# Patient Record
Sex: FEMALE | Race: BLACK OR AFRICAN AMERICAN | NOT HISPANIC OR LATINO | Employment: FULL TIME | URBAN - METROPOLITAN AREA
[De-identification: names, ages, dates, MRNs, and addresses within clinical notes are randomized per-mention and may not be internally consistent; named-entity substitution may affect disease eponyms.]

---

## 2019-01-17 ENCOUNTER — HOSPITAL ENCOUNTER (EMERGENCY)
Facility: MEDICAL CENTER | Age: 19
End: 2019-01-17
Attending: EMERGENCY MEDICINE
Payer: COMMERCIAL

## 2019-01-17 VITALS
BODY MASS INDEX: 26.04 KG/M2 | HEART RATE: 85 BPM | OXYGEN SATURATION: 96 % | SYSTOLIC BLOOD PRESSURE: 122 MMHG | HEIGHT: 70 IN | DIASTOLIC BLOOD PRESSURE: 90 MMHG | TEMPERATURE: 97.5 F | RESPIRATION RATE: 16 BRPM | WEIGHT: 181.88 LBS

## 2019-01-17 DIAGNOSIS — L30.9 ECZEMA, UNSPECIFIED TYPE: ICD-10-CM

## 2019-01-17 DIAGNOSIS — L29.9 ITCHING: ICD-10-CM

## 2019-01-17 DIAGNOSIS — L81.9 HYPERPIGMENTATION: ICD-10-CM

## 2019-01-17 LAB
ALBUMIN SERPL BCP-MCNC: 4.9 G/DL (ref 3.2–4.9)
ALBUMIN/GLOB SERPL: 1.4 G/DL
ALP SERPL-CCNC: 71 U/L (ref 30–99)
ALT SERPL-CCNC: 29 U/L (ref 2–50)
ANION GAP SERPL CALC-SCNC: 10 MMOL/L (ref 0–11.9)
ANISOCYTOSIS BLD QL SMEAR: ABNORMAL
AST SERPL-CCNC: 30 U/L (ref 12–45)
BASOPHILS # BLD AUTO: 0.8 % (ref 0–1.8)
BASOPHILS # BLD: 0.04 K/UL (ref 0–0.12)
BILIRUB SERPL-MCNC: 0.4 MG/DL (ref 0.1–1.5)
BUN SERPL-MCNC: 10 MG/DL (ref 8–22)
CALCIUM SERPL-MCNC: 10.1 MG/DL (ref 8.5–10.5)
CHLORIDE SERPL-SCNC: 104 MMOL/L (ref 96–112)
CO2 SERPL-SCNC: 25 MMOL/L (ref 20–33)
COMMENT 1642: NORMAL
CREAT SERPL-MCNC: 0.71 MG/DL (ref 0.5–1.4)
EOSINOPHIL # BLD AUTO: 0.37 K/UL (ref 0–0.51)
EOSINOPHIL NFR BLD: 7 % (ref 0–6.9)
ERYTHROCYTE [DISTWIDTH] IN BLOOD BY AUTOMATED COUNT: 43 FL (ref 35.9–50)
GLOBULIN SER CALC-MCNC: 3.5 G/DL (ref 1.9–3.5)
GLUCOSE SERPL-MCNC: 93 MG/DL (ref 65–99)
HCG SERPL QL: NEGATIVE
HCT VFR BLD AUTO: 37.2 % (ref 37–47)
HGB BLD-MCNC: 10.9 G/DL (ref 12–16)
IMM GRANULOCYTES # BLD AUTO: 0.01 K/UL (ref 0–0.11)
IMM GRANULOCYTES NFR BLD AUTO: 0.2 % (ref 0–0.9)
LG PLATELETS BLD QL SMEAR: NORMAL
LYMPHOCYTES # BLD AUTO: 2.77 K/UL (ref 1–4.8)
LYMPHOCYTES NFR BLD: 52.7 % (ref 22–41)
MCH RBC QN AUTO: 20.6 PG (ref 27–33)
MCHC RBC AUTO-ENTMCNC: 29.3 G/DL (ref 33.6–35)
MCV RBC AUTO: 70.2 FL (ref 81.4–97.8)
MICROCYTES BLD QL SMEAR: ABNORMAL
MONOCYTES # BLD AUTO: 0.62 K/UL (ref 0–0.85)
MONOCYTES NFR BLD AUTO: 11.8 % (ref 0–13.4)
MORPHOLOGY BLD-IMP: NORMAL
NEUTROPHILS # BLD AUTO: 1.45 K/UL (ref 2–7.15)
NEUTROPHILS NFR BLD: 27.5 % (ref 44–72)
NRBC # BLD AUTO: 0 K/UL
NRBC BLD-RTO: 0 /100 WBC
OVALOCYTES BLD QL SMEAR: NORMAL
PLATELET # BLD AUTO: 493 K/UL (ref 164–446)
PLATELET BLD QL SMEAR: NORMAL
PMV BLD AUTO: 8.9 FL (ref 9–12.9)
POIKILOCYTOSIS BLD QL SMEAR: NORMAL
POTASSIUM SERPL-SCNC: 3.8 MMOL/L (ref 3.6–5.5)
PROT SERPL-MCNC: 8.4 G/DL (ref 6–8.2)
RBC # BLD AUTO: 5.3 M/UL (ref 4.2–5.4)
RBC BLD AUTO: PRESENT
SMUDGE CELLS BLD QL SMEAR: NORMAL
SODIUM SERPL-SCNC: 139 MMOL/L (ref 135–145)
WBC # BLD AUTO: 5.3 K/UL (ref 4.8–10.8)

## 2019-01-17 PROCEDURE — 80053 COMPREHEN METABOLIC PANEL: CPT

## 2019-01-17 PROCEDURE — 99284 EMERGENCY DEPT VISIT MOD MDM: CPT

## 2019-01-17 PROCEDURE — 36415 COLL VENOUS BLD VENIPUNCTURE: CPT

## 2019-01-17 PROCEDURE — 84703 CHORIONIC GONADOTROPIN ASSAY: CPT

## 2019-01-17 PROCEDURE — 85025 COMPLETE CBC W/AUTO DIFF WBC: CPT

## 2019-01-17 RX ORDER — TRIAMCINOLONE ACETONIDE 0.25 MG/G
1 CREAM TOPICAL 2 TIMES DAILY
Qty: 1 TUBE | Refills: 1 | Status: SHIPPED | OUTPATIENT
Start: 2019-01-17 | End: 2019-02-05

## 2019-01-17 ASSESSMENT — PAIN SCALES - GENERAL: PAINLEVEL_OUTOF10: 0

## 2019-01-17 ASSESSMENT — LIFESTYLE VARIABLES: DO YOU DRINK ALCOHOL: NO

## 2019-01-18 NOTE — DISCHARGE INSTRUCTIONS
Please follow-up with the primary care clinic listed.  Return to the emergency department if you develop any new or worsening symptoms including open sores, fevers, worsening rashes, swelling, or any further concerns.  Please visit the [facility] website to find a dermatologist in the area.  Call the primary care clinic listed above tomorrow to schedule a primary care follow-up appointment.

## 2019-01-18 NOTE — ED TRIAGE NOTES
"Chief Complaint   Patient presents with   • Eczema       Pt ambulatory to triage with above complaint.  Pt states she has hx of eczema and that today it got worse.  Pt has dark spots on arms torso and legs.     Pt returned to lobby, educated on triage process, and to inform staff of any changes or concerns.    Blood pressure 126/80, pulse 84, temperature 36.4 °C (97.5 °F), temperature source Temporal, resp. rate 16, height 1.778 m (5' 10\"), weight 82.5 kg (181 lb 14.1 oz), last menstrual period 01/07/2019, SpO2 98 %.      "

## 2019-01-18 NOTE — ED PROVIDER NOTES
"ED Provider Note    Chief Complaint:   Itching    HPI:  Jo Wild is a 18 y.o. female who presents with chief complaint of skin itching and discoloration.  She has been diagnosed with severe eczema previously, states that her symptoms significantly improved with use of triamcinolone cream or other steroid creams, however they recur as soon as she stops using these medications.  Over the past year, she has developed darkening of the skin.  She is seeing her primary care physician at [facility] multiple times, who states this is chronic and unlikely to change.  Affected areas include the trunk, back, as well as all 4 extremities.  For the past week, she has had an exacerbation of skin irritation and itching.  This is identical to previous recurrences of her eczema.  She has not yet been able to follow-up with the [facility] dermatologist or dermatologist in [place] for further evaluation.    She denies any other symptoms, no abnormal bleeding or bruising, no fevers.  She is not currently taking any medications.    Review of Systems:  See HPI for pertinent positives and negatives. All other systems negative.    Past Medical History:       Social History:  Social History     Social History Main Topics   • Smoking status: Never Smoker   • Smokeless tobacco: Never Used   • Alcohol use No   • Drug use: No   • Sexual activity: Not on file       Surgical History:  patient denies any surgical history    Current Medications:  Home Medications     Reviewed by Luis Myers R.N. (Registered Nurse) on 01/17/19 at 2010  Med List Status: Not Addressed   Medication Last Dose Status        Patient Chris Taking any Medications                       Allergies:  No Known Allergies    Physical Exam:  Vital Signs: /80   Pulse 84   Temp 36.4 °C (97.5 °F) (Temporal)   Resp 16   Ht 1.778 m (5' 10\")   Wt 82.5 kg (181 lb 14.1 oz)   LMP 01/07/2019 (Exact Date)   SpO2 98%   BMI 26.10 kg/m²   Constitutional: Alert, no acute " distress  HENT: Moist mucus membranes  Eyes: Pupils equal and reactive, normal conjunctiva  Neck: Supple  Cardiovascular: Extremities are warm and well perfused  Pulmonary: No respiratory distress, normal work of breathing  Abdomen: Soft, non-distended  Skin: Warm, dry, scaling itching skin over the flexor surfaces, hyperpigmentation, possible purpura over the thighs, calves, and arms, no open sores, no petechiae  Musculoskeletal: Normal range of motion in all extremities  Neurologic: Alert, oriented, normal speech, normal motor function  Psychiatric: Normal and appropriate mood and affect    Medical records reviewed for continuity of care.  No recent visits for similar symptoms per    Labs:  Labs Reviewed   COMP METABOLIC PANEL - Abnormal; Notable for the following:        Result Value    Total Protein 8.4 (*)     All other components within normal limits   CBC WITH DIFFERENTIAL   HCG QUAL SERUM   ESTIMATED GFR       Radiology:    Differential diagnosis:  Purpura, vasculitis, eczema, chronic skin changes    MDM:  History and physical exam as documented above.  Patient presents with intermittent irritation of the skin for the past year.  She is previously been diagnosed with eczema, however since her hyperpigmentation and skin darkening began she has not had any screening lab work.  Abnormal coloration may represent vasculitis or purpura, however this may also be a manifestation of her chronic eczema.    On laboratory evaluation CMP is entirely within normal limits.  Liver enzymes within normal limits.  No electrolyte abnormalities.  HCG is negative ruling out pregnancy and pregnancy related complications. CBC demonstrates normal white blood count.  Hemoglobin is low at 10.9.  Platelet count is above normal, no thrombocytopenia.    Plan at this time is for discharge home.  I will refill her triamcinolone cream.  She is counseled to follow-up with primary care in [place] or at [place].  She is referred to [facility] website for assistance with finding a dermatologist.  Return precautions discussed including worsening pain, redness, itching, swelling, fevers, or any further concerns.    Blood pressure today is greater than 120/80, patient is instructed to follow up with primary care provider for blood pressure recheck.    Disposition:  Discharged home in stable condition    Final Impression:  1. Eczema, unspecified type    2. Hyperpigmentation    3. Itching        Electronically signed by: Nadia Hook, 1/17/2019 10:39 PM

## 2019-01-25 ENCOUNTER — HOSPITAL ENCOUNTER (EMERGENCY)
Facility: MEDICAL CENTER | Age: 19
End: 2019-01-25
Attending: EMERGENCY MEDICINE
Payer: COMMERCIAL

## 2019-01-25 VITALS
HEIGHT: 70 IN | HEART RATE: 84 BPM | RESPIRATION RATE: 16 BRPM | BODY MASS INDEX: 26.67 KG/M2 | DIASTOLIC BLOOD PRESSURE: 73 MMHG | WEIGHT: 186.29 LBS | SYSTOLIC BLOOD PRESSURE: 117 MMHG | TEMPERATURE: 97.4 F | OXYGEN SATURATION: 98 %

## 2019-01-25 DIAGNOSIS — L30.9 ECZEMA, UNSPECIFIED TYPE: ICD-10-CM

## 2019-01-25 PROCEDURE — 99283 EMERGENCY DEPT VISIT LOW MDM: CPT

## 2019-01-25 RX ORDER — PREDNISONE 10 MG/1
TABLET ORAL
Qty: 42 TAB | Refills: 0 | Status: SHIPPED | OUTPATIENT
Start: 2019-01-25 | End: 2019-04-09

## 2019-01-25 NOTE — ED PROVIDER NOTES
"ED Provider Note    CHIEF COMPLAINT   Chief Complaint   Patient presents with   • Rash       HPI   Jo Wild is a 18 y.o. female who presents to the emergency department with chief complaint of eczema.  The patient reports diffuse rash over her extremities and torso.  She is been using triamcinolone ointment with really minimal improvement.  She has not had any fevers.  No pain but she does describe severe itching.    REVIEW OF SYSTEMS   See HPI for further details. All other systems are negative.     PAST MEDICAL HISTORY   History reviewed. No pertinent past medical history.    FAMILY HISTORY  History reviewed. No pertinent family history.    SOCIAL HISTORY  Social History     Social History   • Marital status: Single     Spouse name: N/A   • Number of children: N/A   • Years of education: N/A     Social History Main Topics   • Smoking status: Never Smoker   • Smokeless tobacco: Never Used   • Alcohol use No   • Drug use: No   • Sexual activity: Not on file     Other Topics Concern   • Not on file     Social History Narrative   • No narrative on file        SURGICAL HISTORY  History reviewed. No pertinent surgical history.    CURRENT MEDICATIONS   Home Medications    **Home medications have not yet been reviewed for this encounter**         ALLERGIES   No Known Allergies    PHYSICAL EXAM  VITAL SIGNS: /73   Pulse 84   Temp 36.3 °C (97.4 °F) (Temporal)   Resp 16   Ht 1.778 m (5' 10\")   Wt 84.5 kg (186 lb 4.6 oz)   LMP 01/07/2019 (Exact Date)   SpO2 98%   BMI 26.73 kg/m²   Constitutional: Well developed, Well nourished, No acute distress, Non-toxic appearance.   Cardiovascular: Normal heart rate, Normal rhythm, No murmurs, No rubs, No gallops.   Thorax & Lungs: Normal breath sounds, No respiratory distress, No wheezing, No chest tenderness.   Skin: Eczema noted on all extremities including the extensor surface of bilateral lower extremities and bilateral upper extremities.  Also involving the anterior " abdominal wall.  Scattered excoriations.  No purulent discharge.  No abscess.  No evidence of secondary bacterial infection.  Extremities: Intact distal pulses, No edema, No tenderness, No cyanosis, No clubbing.       COURSE & MEDICAL DECISION MAKING  Pertinent Labs & Imaging studies reviewed. (See chart for details)    Patient presents today with fairly severe eczema.  Conservative measures were discussed.  However given the extent of the involvement of the patient's skin I feel that oral glucocorticoids are indicated.  I will put the patient on a 12-day tapering dose of prednisone.  The patient will continue using triamcinolone for any particularly problematic areas.  The patient sees a physician at [place] in [place].  I will have her follow-up for ongoing care.  Patient is discharged home in stable condition.    The patient will return for new or worsening symptoms and is stable at the time of discharge.    The patient is referred to a primary physician for blood pressure management, diabetic screening, and for all other preventative health concerns.    DISPOSITION:  Patient will be discharged home in stable condition.    FOLLOW UP:  [facility], Emergency Dept  [facility] [facility] [address] [phone]        your doctor at [facility]    Schedule an appointment as soon as possible for a visit         OUTPATIENT MEDICATIONS:  New Prescriptions    No medications on file       FINAL IMPRESSION  1. Eczema, unspecified type                  Electronically signed by: Roland Dalton, 1/25/2019 7:30 AM

## 2019-01-25 NOTE — ED NOTES
Pt reports hx of eczema without relief from previous treatments.  Pt with skin discoloration and itching.

## 2019-01-25 NOTE — ED TRIAGE NOTES
Pt ambulatory to triage reports being diagnosed with eczema on 1-17-19. Pt reports no relief with prescribed meds. VSS.  Educated on triage process, encouraged to inform staff of any changes.

## 2019-01-25 NOTE — ED NOTES
Pt provided with discharge instructions, prescriptions x1, instructions for follow up appointment, s/s of when to seek emergency care.  Pt verbalizes understanding.  Pt discharged in good condition.

## 2019-02-05 ENCOUNTER — HOSPITAL ENCOUNTER (EMERGENCY)
Facility: MEDICAL CENTER | Age: 19
End: 2019-02-05
Attending: EMERGENCY MEDICINE
Payer: COMMERCIAL

## 2019-02-05 VITALS
WEIGHT: 186.29 LBS | SYSTOLIC BLOOD PRESSURE: 118 MMHG | DIASTOLIC BLOOD PRESSURE: 79 MMHG | HEIGHT: 70 IN | RESPIRATION RATE: 16 BRPM | TEMPERATURE: 97.7 F | BODY MASS INDEX: 26.67 KG/M2 | OXYGEN SATURATION: 96 % | HEART RATE: 95 BPM

## 2019-02-05 DIAGNOSIS — L30.9 ECZEMA, UNSPECIFIED TYPE: ICD-10-CM

## 2019-02-05 PROCEDURE — 99283 EMERGENCY DEPT VISIT LOW MDM: CPT

## 2019-02-05 RX ORDER — HYDROXYZINE HYDROCHLORIDE 25 MG/1
25 TABLET, FILM COATED ORAL 3 TIMES DAILY PRN
Qty: 30 TAB | Refills: 0 | Status: SHIPPED | OUTPATIENT
Start: 2019-02-05

## 2019-02-05 RX ORDER — TRIAMCINOLONE ACETONIDE 1 MG/G
1 OINTMENT TOPICAL 2 TIMES DAILY
Qty: 1 TUBE | Refills: 0 | Status: SHIPPED | OUTPATIENT
Start: 2019-02-05

## 2019-02-05 RX ORDER — TRIAMCINOLONE ACETONIDE 0.25 MG/G
1 CREAM TOPICAL 2 TIMES DAILY
Qty: 1 TUBE | Refills: 1 | Status: SHIPPED | OUTPATIENT
Start: 2019-02-05

## 2019-02-06 NOTE — ED NOTES
Pt ambulate to room, agree with triage notes. Pt has purplish discoloration that appears to be all over her body. Pt states has been present x 1 year but worsened since September.

## 2019-02-06 NOTE — ED TRIAGE NOTES
"Chief Complaint   Patient presents with   • Rash     diffuse eczema, seen for same last week, taking prednisone as rx'd.      Pt to triage for above. Reports she was using the cream but it wasn't working well, states, \"it was thinning her skin\". NAD noted, VSS.    Pt returned to [facility]. Educated on triage process and to inform staff of any changes.     /85   Pulse 100   Temp 36.2 °C (97.1 °F) (Temporal)   Resp 18   Ht 1.778 m (5' 10\")   Wt 84.5 kg (186 lb 4.6 oz)   LMP 01/07/2019 (Exact Date)   SpO2 98%   BMI 26.73 kg/m²     "

## 2019-02-06 NOTE — ED NOTES
Discharge instructions given to patient, prescriptions provided, a verbal understanding of all instructions was stated. Pt preferred to walk out accompanied by self VSS,  all belongings accounted for. Pt instructed to follow up with dermatologist.

## 2019-03-02 NOTE — ED PROVIDER NOTES
"ED Provider Note    CHIEF COMPLAINT  Chief Complaint   Patient presents with   • Rash     diffuse eczema, seen for same last week, taking prednisone as rx'd.        HPI  Jo Wild is a 18 y.o. female who presents for evaluation of eczema.  She complains of severe itching.  Patient has a history of eczema dating back years.  She was prescribed prednisone is asking for something to control her itching.  The symptoms have increased in severity over the past year.  She is attempted to see a dermatologist but has been unable to through the [employer] system in [place].  Nothing makes her symptoms worse or better.  She denies any fever chills sore throat chest pain cough.  She has been using triamcinolone ointment.    REVIEW OF SYSTEMS  See HPI for further details.     PAST MEDICAL HISTORY  History reviewed. No pertinent past medical history.    FAMILY HISTORY  No family history on file.    SOCIAL HISTORY  Social History     Social History   • Marital status: Single     Spouse name: N/A   • Number of children: N/A   • Years of education: N/A     Social History Main Topics   • Smoking status: Never Smoker   • Smokeless tobacco: Never Used   • Alcohol use No   • Drug use: No   • Sexual activity: Not on file     Other Topics Concern   • Not on file     Social History Narrative   • No narrative on file       SURGICAL HISTORY  History reviewed. No pertinent surgical history.    CURRENT MEDICATIONS  Home Medications     Reviewed by João Segura R.N. (Registered Nurse) on 02/05/19 at 4538  Med List Status: Partial   Medication Last Dose Status   predniSONE (DELTASONE) 10 MG Tab taking Active   triamcinolone acetonide (KENALOG) 0.025 % Cream  Active                 ALLERGIES  No Known Allergies    PHYSICAL EXAM  VITAL SIGNS: /79   Pulse 95   Temp 36.5 °C (97.7 °F) (Temporal)   Resp 16   Ht 1.778 m (5' 10\")   Wt 84.5 kg (186 lb 4.6 oz)   LMP 01/07/2019 (Exact Date)   SpO2 96%   BMI 26.73 kg/m²   Constitutional :  " Well developed, Well nourished, No acute distress, Non-toxic appearance.   HENT: Head is atraumatic normocephalic moist mucous membranes  Eyes: Normal-appearing nonicteric  Neck: Normal range of motion, No tenderness, Supple, No stridor.   Lymphatic: No cervical adenopathy is noted.   Cardiovascular: Normal heart rate, Normal rhythm, No murmurs, No rubs, No gallops.   Thorax & Lungs: Equal breath sounds bilaterally no rales rhonchi  Skin: Warm, Dry, patient is evidence of what appears to be chronic eczema, on the extensor surface of lower extremities upper extremities anterior wall back she has numbers excoriations no evidence of cellulitis or purulent discharge  Neurologically she is awake she is alert she has no focal neurological finding              COURSE & MEDICAL DECISION MAKING  Pertinent Labs & Imaging studies reviewed. (See chart for details)  The patient is presenting for evaluation of what appears to be eczema of her chronic nature.  The patient really needs to seen by dermatology for management of her chronic condition she has no evidence of secondary infection.  She will be discharged with hydroxyzine for management of itching refill of her Kenalog ointment and referral to the [facility] for management.    FINAL IMPRESSION  1.  Acute exacerbation of chronic eczema  2.   3.      Electronically signed by: Gaston Galindo, 3/1/2019

## 2019-04-05 ENCOUNTER — HOSPITAL ENCOUNTER (EMERGENCY)
Facility: MEDICAL CENTER | Age: 19
End: 2019-04-05
Attending: EMERGENCY MEDICINE
Payer: COMMERCIAL

## 2019-04-05 VITALS
BODY MASS INDEX: 26.07 KG/M2 | HEART RATE: 84 BPM | OXYGEN SATURATION: 100 % | RESPIRATION RATE: 20 BRPM | TEMPERATURE: 98.1 F | DIASTOLIC BLOOD PRESSURE: 78 MMHG | HEIGHT: 70 IN | WEIGHT: 182.1 LBS | SYSTOLIC BLOOD PRESSURE: 125 MMHG

## 2019-04-05 DIAGNOSIS — L30.9 ECZEMA, UNSPECIFIED TYPE: ICD-10-CM

## 2019-04-05 PROCEDURE — 96372 THER/PROPH/DIAG INJ SC/IM: CPT

## 2019-04-05 PROCEDURE — 99284 EMERGENCY DEPT VISIT MOD MDM: CPT

## 2019-04-05 PROCEDURE — 700111 HCHG RX REV CODE 636 W/ 250 OVERRIDE (IP): Performed by: EMERGENCY MEDICINE

## 2019-04-05 RX ORDER — PIMECROLIMUS 10 MG/G
CREAM TOPICAL
Qty: 1 TUBE | Refills: 0 | Status: SHIPPED | OUTPATIENT
Start: 2019-04-05

## 2019-04-05 RX ORDER — TRIAMCINOLONE ACETONIDE 40 MG/ML
60 INJECTION, SUSPENSION INTRA-ARTICULAR; INTRAMUSCULAR ONCE
Status: COMPLETED | OUTPATIENT
Start: 2019-04-05 | End: 2019-04-05

## 2019-04-05 RX ADMIN — TRIAMCINOLONE ACETONIDE 60 MG: 40 INJECTION, SUSPENSION INTRA-ARTICULAR; INTRAMUSCULAR at 09:38

## 2019-04-05 NOTE — ED PROVIDER NOTES
ED Provider Note    Scribed for Roland Dalton M.D. by Ej Coe. 4/5/2019  8:44 AM    Primary care provider: Pcp Pt States None  Means of arrival: walk in  History obtained from: patient  History limited by: none    CHIEF COMPLAINT  Chief Complaint   Patient presents with   • Rash     Pt reports worsening of her eczema x 2 months. Pt reports that it has gotten so she is in pain while sleeping. Pt previously prescribed triamcinolone, but has no had a prescription for appx 2 months.        HPI  Jo Wild is a 18 y.o. female who presents to the Emergency Department complaining of persistent and gradually worsening rash for the last 2 months. She localizes the rash diffusely over her body but greatest in pain on her buttocks. Patient reports associated pain over the rash, itching. She states that her rash has been worsened in for more than 2 months. Patient has been evaluated previously and prescribed triamcinolone which worked well to improve her pain, but she has run of this medication and has had touble obtaining this again secondary to her insurance being from [place]. She states that she has an appointment next Thursday with a provider in [place] to establish care under her insurance. She states that her pain has become severe, causing her to lose sleep, prompting her to return to the ED for reevaluation. She denies shortness of breath.     REVIEW OF SYSTEMS  Pertinent positives include rash, pain, itching.   Pertinent negatives include no shortness of breath.    All other systems reviewed and negative. See HPI for further details.     PAST MEDICAL HISTORY   No history of asthma.     SURGICAL HISTORY  patient denies any surgical history    SOCIAL HISTORY  Social History   Substance Use Topics   • Smoking status: Never Smoker   • Smokeless tobacco: Never Used   • Alcohol use No      History   Drug Use No       FAMILY HISTORY  History reviewed. No pertinent family history.    CURRENT  "MEDICATIONS  Home Medications     Reviewed by Александр Lemos R.N. (Registered Nurse) on 04/05/19 at 0647  Med List Status: Not Addressed   Medication Last Dose Status   hydrOXYzine HCl (ATARAX) 25 MG Tab  Active   predniSONE (DELTASONE) 10 MG Tab  Active   triamcinolone acetonide (KENALOG) 0.025 % Cream  Active   triamcinolone acetonide (KENALOG) 0.1 % Ointment  Active                ALLERGIES  No Known Allergies    PHYSICAL EXAM  VITAL SIGNS: /78   Pulse (!) 104   Temp 36.6 °C (97.8 °F)   Resp 16   Ht 1.778 m (5' 10\")   Wt 82.6 kg (182 lb 1.6 oz)   LMP 03/26/2019   SpO2 100%   Breastfeeding? No   BMI 26.13 kg/m²     Nursing note and vitals reviewed.  Constitutional: Well-developed and well-nourished. No distress.   HENT: Head is normocephalic and atraumatic. Oropharynx is clear and moist without exudate or erythema.   Eyes: Pupils are equal, round, and reactive to light. Conjunctiva are normal.   Cardiovascular: Normal rate and regular rhythm. No murmur heard. Normal radial pulses.  Pulmonary/Chest: Breath sounds normal. No wheezes or rales.   Abdominal: Soft and non-tender. No distention    Musculoskeletal: Extremities exhibit normal range of motion without edema or tenderness.   Neurological: Awake, alert and oriented to person, place, and time. No focal deficits noted.  Skin: Skin is warm and dry. Diffuse dry scaly rash over torso and extremities consistent with severe atopic dermatitis. No evidence of secondary bacterial infection.    Psychiatric: Normal mood and affect. Appropriate for clinical situation.    DIAGNOSTIC STUDIES / PROCEDURES    COURSE & MEDICAL DECISION MAKING  Nursing notes, VS, PMSFHx reviewed in chart.     Review of past medical records shows the patient been to [facility] ED several times with complaints of eczema, last time Feb 5th.      8:44 AM - Patient seen and examined at bedside. She reports a good relief for her symptoms with previous steroid treatment. Discussed treatment " "with a longer acting steroid to bridge her until she is able to follow up with a primary next week. Patient verbalizes understanding and agreement to this plan of care. The differential diagnoses include but are not limited to: dermatitis     8:55 AM - I discussed the patient's case and the above findings with pharmacist who does have Kenalog in stock. Patient will be treated with Kenalog-40 60 mg. She will be prescribed Elidel 1% for discharge. Patient is instructed to return with any new or worsening symptoms.    Discharge vitals: /78   Pulse (!) 104   Temp 36.6 °C (97.8 °F)   Resp 16   Ht 1.778 m (5' 10\")   Wt 82.6 kg (182 lb 1.6 oz)   LMP 03/26/2019   SpO2 100%   Breastfeeding? No   BMI 26.13 kg/m²       Patient presents today with severe eczema.  She has had some improvement in the past with a 12-day course of prednisone but this seems to wear off.  I feel that a trial of Kenalog is appropriate.  Will start treatment with a Kenalog injection in the emergency department and prescribed Elidel.  She has an appointment with her physician in [place].      The patient will return for new or worsening symptoms and is stable at the time of discharge.    The patient is referred to a primary physician for blood pressure management, diabetic screening, and for all other preventative health concerns.    DISPOSITION:  Patient will be discharged home in stable condition.    FOLLOW UP:  [facility], Emergency Dept  [address] [address] [phone]    If symptoms worsen    your doctor, as planned            OUTPATIENT MEDICATIONS:  New Prescriptions    PIMECROLIMUS (ELIDEL) 1 % CREAM    Apply to affected areas twice daily as needed.     FINAL IMPRESSION  1. Eczema, unspecified type          I, Ej Coe (Lacey), am scribing for, and in the presence of, Roland Dalton M.D..    Electronically signed by: Ej Serna), 4/5/2019    Roland REYES " ÁNGEL Dalton. personally performed the services described in this documentation, as scribed by Ej Coe in my presence, and it is both accurate and complete.    The note accurately reflects work and decisions made by me.  Roland Dalton  4/5/2019  1:19 PM

## 2019-04-05 NOTE — ED NOTES
Discharge instructions went through with patient, told to follow up with PCP, told to return for new or concerning symptoms

## 2019-04-06 ENCOUNTER — HOSPITAL ENCOUNTER (EMERGENCY)
Facility: MEDICAL CENTER | Age: 19
End: 2019-04-06
Attending: EMERGENCY MEDICINE
Payer: COMMERCIAL

## 2019-04-06 VITALS
HEART RATE: 90 BPM | BODY MASS INDEX: 26.29 KG/M2 | RESPIRATION RATE: 16 BRPM | SYSTOLIC BLOOD PRESSURE: 113 MMHG | WEIGHT: 183.2 LBS | DIASTOLIC BLOOD PRESSURE: 67 MMHG | OXYGEN SATURATION: 98 % | TEMPERATURE: 97 F

## 2019-04-06 DIAGNOSIS — L20.9 ATOPIC DERMATITIS, UNSPECIFIED TYPE: ICD-10-CM

## 2019-04-06 PROCEDURE — A9270 NON-COVERED ITEM OR SERVICE: HCPCS | Performed by: EMERGENCY MEDICINE

## 2019-04-06 PROCEDURE — 99284 EMERGENCY DEPT VISIT MOD MDM: CPT

## 2019-04-06 PROCEDURE — 700102 HCHG RX REV CODE 250 W/ 637 OVERRIDE(OP): Performed by: EMERGENCY MEDICINE

## 2019-04-06 RX ORDER — HYDROXYZINE HYDROCHLORIDE 25 MG/1
25 TABLET, FILM COATED ORAL 3 TIMES DAILY PRN
Qty: 30 TAB | Refills: 0 | Status: SHIPPED | OUTPATIENT
Start: 2019-04-06

## 2019-04-06 RX ORDER — HYDROXYZINE HYDROCHLORIDE 25 MG/1
50 TABLET, FILM COATED ORAL ONCE
Status: COMPLETED | OUTPATIENT
Start: 2019-04-06 | End: 2019-04-06

## 2019-04-06 RX ORDER — CLOBETASOL PROPIONATE 0.5 MG/G
1 OINTMENT TOPICAL 2 TIMES DAILY
Qty: 60 G | Refills: 0 | Status: SHIPPED | OUTPATIENT
Start: 2019-04-06

## 2019-04-06 RX ADMIN — HYDROXYZINE HYDROCHLORIDE 50 MG: 25 TABLET, FILM COATED ORAL at 19:38

## 2019-04-07 NOTE — ED NOTES
Patient stable w no ss of distress at this time. Discharge instructions reviewed and understanding verbalized. Ready for discharge after medication admin.

## 2019-04-07 NOTE — ED TRIAGE NOTES
Amb to triage w/ c/o a flare up of her eczema.  Was here yesterday for the same but states worse today.

## 2019-04-07 NOTE — ED PROVIDER NOTES
ED Provider Note    Scribed for Kael Cunningham M.D. by Sudhir Khanna. 4/6/2019, 7:15 PM.    Primary care provider: Pcp Pt States None  Means of arrival: Walk-In  History obtained from: Patient  History limited by: None    CHIEF COMPLAINT  Chief Complaint   Patient presents with   • Itchy   • Other       HPI  Jo Wild is a 18 y.o. Female, with history of eczema, who presents to the Emergency Department for a worsening rash which onset 2 months. The rash is diffuse but worse to her buttocks. The patient reports an associated pruritus and burning sensation which is worse when the skin is stretched. She presented here with the same complaint and treated with Kenalog-40 60 mg which she believes exacerbated her symptoms. The patient was also prescribes Elidel 1% cream which she was not able to fill secondary to cost. The patient has been using Vaseline and hydrocortisone pills but no oral medications. She also tried Benadryl which did not alleviate her symptoms and also tried atarax which she states she didn't tolerate well. She has an appointment with her primary care physician in 5 days.    REVIEW OF SYSTEMS  See HPI for further details.    PAST MEDICAL HISTORY       SURGICAL HISTORY  patient denies any surgical history    SOCIAL HISTORY  Social History   Substance Use Topics   • Smoking status: Never Smoker   • Smokeless tobacco: Never Used   • Alcohol use No      History   Drug Use No       FAMILY HISTORY  History reviewed. No pertinent family history.    CURRENT MEDICATIONS  Reviewed.  See Encounter Summary.     ALLERGIES  No Known Allergies    PHYSICAL EXAM  VITAL SIGNS: /78   Pulse 96   Temp 36.1 °C (97 °F) (Temporal)   Resp 14   Wt 83.1 kg (183 lb 3.2 oz)   LMP 03/26/2019   SpO2 98%   BMI 26.29 kg/m²   Constitutional: Alert in no apparent distress.  HENT: Normocephalic, Atraumatic, Bilateral external ears normal. Nose normal.   Eyes: Conjunctiva normal, non-icteric.   Skin: Skin is warm  and dry. Diffuse dry scaly rash over torso and extremities consistent with severe atopic dermatitis. No evidence of secondary bacterial infection  Thorax & Lungs: No respiratory distress, No wheezing.  Neurologic: Alert, Grossly non-focal.       Psychiatric: Affect normal, Judgment normal, Mood normal, Appears appropriate and not intoxicated.     COURSE & MEDICAL DECISION MAKING  Nursing notes, VS, PMSFHx reviewed in chart.    7:15 PM - Patient seen and examined at bedside. Patient will be treated with 50 mg Atarax. I discussed that the patient will need to follow-up with her primary care physician for referral to dermatology as there is not much we can do to treat this.     Decision Making:  This is a 18 y.o. year old female who presents with above complaint.  Patient was recently here for the same.  Unable to afford prescription medication is provided at that visit.  She is not using any other significant over-the-counter medications for her treatment.  At this time I recommended Tylenol, NSAIDs, Atarax and I will start her on a complete is all cream which is cheaper.  Hopefully this will bridge her to her appointment with PCP in [place] this coming Thursday and 5 days time.  She is understanding return precautions if needed.    DISPOSITION:  Patient will be discharged home in good condition.    Discharge Medications:  Discharge Medication List as of 4/6/2019  7:30 PM      START taking these medications    Details   !! hydrOXYzine HCl (ATARAX) 25 MG Tab Take 1 Tab by mouth 3 times a day as needed for Itching., Disp-30 Tab, R-0, Print Rx Paper      clobetasol (TEMOVATE) 0.05 % Ointment Apply 1 Application to affected area(s) 2 times a day.Apply to affected areas bid.Disp-60 g, R-0, Print Rx Paper       !! - Potential duplicate medications found. Please discuss with provider.          The patient was discharged home (see d/c instructions) and told to return immediately for any signs or symptoms listed, or any  worsening at all.  The patient verbally agreed to the discharge precautions and follow-up plan which is documented in EPIC.      FINAL IMPRESSION  1. Atopic dermatitis, unspecified type          I, Sudhir Khanna (Scribe), am scribing for, and in the presence of, Kael Cunningham M.D..    Electronically signed by: Sudhir Khanna (Scribe), 4/6/2019    I, Kael Cunningham M.D. personally performed the services described in this documentation, as scribed by Sudhir Khanna in my presence, and it is both accurate and complete.    The note accurately reflects work and decisions made by me.  Kael Cunningham  4/6/2019  9:40 PM    E

## 2019-04-09 ENCOUNTER — HOSPITAL ENCOUNTER (EMERGENCY)
Facility: MEDICAL CENTER | Age: 19
End: 2019-04-09
Attending: EMERGENCY MEDICINE
Payer: COMMERCIAL

## 2019-04-09 VITALS
SYSTOLIC BLOOD PRESSURE: 118 MMHG | RESPIRATION RATE: 18 BRPM | DIASTOLIC BLOOD PRESSURE: 64 MMHG | HEART RATE: 89 BPM | WEIGHT: 182.98 LBS | BODY MASS INDEX: 26.26 KG/M2 | OXYGEN SATURATION: 99 % | TEMPERATURE: 97.5 F

## 2019-04-09 DIAGNOSIS — L23.9 ECZEMA, ALLERGIC: ICD-10-CM

## 2019-04-09 PROCEDURE — 99284 EMERGENCY DEPT VISIT MOD MDM: CPT

## 2019-04-09 PROCEDURE — 96372 THER/PROPH/DIAG INJ SC/IM: CPT

## 2019-04-09 PROCEDURE — 700102 HCHG RX REV CODE 250 W/ 637 OVERRIDE(OP): Performed by: EMERGENCY MEDICINE

## 2019-04-09 PROCEDURE — A9270 NON-COVERED ITEM OR SERVICE: HCPCS | Performed by: EMERGENCY MEDICINE

## 2019-04-09 PROCEDURE — 700111 HCHG RX REV CODE 636 W/ 250 OVERRIDE (IP): Performed by: EMERGENCY MEDICINE

## 2019-04-09 RX ORDER — LORAZEPAM 1 MG/1
1 TABLET ORAL ONCE
Status: COMPLETED | OUTPATIENT
Start: 2019-04-09 | End: 2019-04-09

## 2019-04-09 RX ORDER — DIPHENHYDRAMINE HYDROCHLORIDE 50 MG/ML
50 INJECTION INTRAMUSCULAR; INTRAVENOUS ONCE
Status: COMPLETED | OUTPATIENT
Start: 2019-04-09 | End: 2019-04-09

## 2019-04-09 RX ORDER — PREDNISONE 20 MG/1
40 TABLET ORAL DAILY
Qty: 10 TAB | Refills: 0 | Status: SHIPPED | OUTPATIENT
Start: 2019-04-09 | End: 2019-04-14

## 2019-04-09 RX ORDER — PREDNISONE 20 MG/1
40 TABLET ORAL ONCE
Status: COMPLETED | OUTPATIENT
Start: 2019-04-09 | End: 2019-04-09

## 2019-04-09 RX ADMIN — PREDNISONE 40 MG: 20 TABLET ORAL at 21:44

## 2019-04-09 RX ADMIN — LORAZEPAM 1 MG: 1 TABLET ORAL at 21:44

## 2019-04-09 RX ADMIN — DIPHENHYDRAMINE HYDROCHLORIDE 50 MG: 50 INJECTION INTRAMUSCULAR; INTRAVENOUS at 21:44

## 2019-04-10 NOTE — ED NOTES
All lines and monitors discontinued. Discharge instructions given, questions answered.    ambulatory out of ER, escorted by family.  Instructed not to drive after taking pain medication and pt verbalizes understanding.  Rx x prednisone given.

## 2019-04-10 NOTE — ED PROVIDER NOTES
ER Provider Note     Scribed for Claudy Poole M.D. by Dale Fu. 4/9/2019, 8:58 PM.    Primary Care Provider: None noted  Means of Arrival: Walk-In  History obtained from: Patient  History limited by: None     CHIEF COMPLAINT  Chief Complaint   Patient presents with   • Facial Swelling     +anasarca   • Allergic Reaction     Pt was seen two days ago for a rash, and states the eledil cream and kenalog shot are causing full body swelling/painful skin reaction     [facility]  Jo iWld is a 18 y.o. female who presents to the Emergency Department for evaluation of diffuse skin rash which began 3 days ago and has been worsening in severity. The rash is described as a burning and itching sensation. She state she came here before seeing her primary care physician in [place]. The patient was seen here 2 days ago for a rash. The following day she began experiencing the burning sensation diffusely across her entire body. She has associated chills, and swelling diffusely across her body; her skin also has a malodor and is slightly discolored. She states that Eledil cream and Kenalog shots are causing the swelling and painful skin rash. She has a history of eczema. She states she has been taking benadryl and applying topical ointments to alleviate symptoms with no effect. She denies any modifying factors. She denies any history of diabetes.     REVIEW OF SYSTEMS  See HPI for further details. All other systems are negative.     PAST MEDICAL HISTORY  None noted     SURGICAL HISTORY  patient denies any surgical history    SOCIAL HISTORY  Social History   Substance Use Topics   • Smoking status: Never Smoker   • Smokeless tobacco: Never Used   • Alcohol use No      History   Drug Use No     FAMILY HISTORY  None noted.    CURRENT MEDICATIONS  Home Medications     Reviewed by Giancarlo Wise R.N. (Registered Nurse) on 04/09/19 at 204  Med List Status: <None>   Medication Last Dose Status   clobetasol (TEMOVATE)  0.05 % Ointment  Active   hydrOXYzine HCl (ATARAX) 25 MG Tab  Active   hydrOXYzine HCl (ATARAX) 25 MG Tab  Active   pimecrolimus (ELIDEL) 1 % cream  Active   predniSONE (DELTASONE) 10 MG Tab  Active   triamcinolone acetonide (KENALOG) 0.025 % Cream  Active   triamcinolone acetonide (KENALOG) 0.1 % Ointment  Active              ALLERGIES  No Known Allergies    PHYSICAL EXAM  VITAL SIGNS: /61   Pulse 96   Temp 36.4 °C (97.5 °F) (Temporal)   Resp 20   Wt 83 kg (182 lb 15.7 oz)   LMP 03/26/2019   SpO2 98%   BMI 26.26 kg/m²    Constitutional: Alert in no apparent distress.  HENT: No signs of trauma, Bilateral external ears normal, Nose normal.   Eyes: Pupils are equal and reactive, Conjunctiva normal, Non-icteric.   Neck: Normal range of motion, No tenderness, Supple, No stridor.   Lymphatic: No lymphadenopathy noted.   Cardiovascular: Regular rate and rhythm, no murmurs.   Thorax & Lungs: Normal breath sounds, No respiratory distress, No wheezing, No chest tenderness.   Abdomen: Bowel sounds normal, Soft, No tenderness, No masses, No pulsatile masses. No peritoneal signs.  Skin: Warm, Diffuse scaly rash on torso and upper extremities up to patient’s shoulder and down to patient’s knees with no purulence or drainage. Large areas of flaking skin.   Back: No bony tenderness, No CVA tenderness.   Extremities: Intact distal pulses, No edema, No tenderness, No cyanosis.  Musculoskeletal: Good range of motion in all major joints. No tenderness to palpation or major deformities noted.   Neurologic: Alert , Normal motor function, Normal sensory function, No focal deficits noted.   Psychiatric: Affect normal, Judgment normal, Mood normal.     COURSE & MEDICAL DECISION MAKING  Pertinent Labs & Imaging studies reviewed. (See chart for details)    This is a 18 y.o. female that presents with what appears to be severe eczema.  Patient has no systemic symptoms of anaphylaxis.  She has tried topical steroids as well as  antipruritic medications and still continues to have severe symptoms.  At this time I believe that steroids will be the answer for her.  I do not see any evidence of superinfection.  I do not see any evidence of fungal infection..     8:58 PM - Patient seen and examined at bedside.  Patient will be medicated with 40mg Prednisone, 50mg Benadryl injection, 1mg Ativan for her symptoms. I discussed plan of care and discharge with patient and patient verbalizes agreement and understands.    DISPOSITION:  Patient will be discharged home in stable condition.    FOLLOW UP:  [address] [address]  [phone]        OUTPATIENT MEDICATIONS:  New Prescriptions    PREDNISONE (DELTASONE) 20 MG TAB    Take 2 Tabs by mouth every day for 5 days.     FINAL IMPRESSION  1. Eczema, allergic       Dale ERYES (Scribe), am scribing for, and in the presence of, Claudy Poole M.D..    Electronically signed by: Dale Fu (Balibe), 4/9/2019    IClaudy M.D. personally performed the services described in this documentation, as scribed by Dale Fu in my presence, and it is both accurate and complete.     C    The note accurately reflects work and decisions made by me.  Claudy Poole  4/10/2019  12:59 AM

## 2019-04-10 NOTE — ED TRIAGE NOTES
Jo Naheed Wild  18 y.o. female  Chief Complaint   Patient presents with   • Facial Swelling     +anasarca   • Allergic Reaction     Pt was seen two days ago for a rash, and states the eledil cream and kenalog shot are causing full body swelling/painful skin reaction       Pt amb to triage with steady gait for above complaint.  Pt is alert and oriented, speaking in full sentences, follows commands and responds appropriately to questions. NAD. Resp are even and unlabored.  Pt placed in lobby. Pt educated on triage process. Pt encouraged to alert staff for any changes.  /61   Pulse 96   Temp 36.4 °C (97.5 °F) (Temporal)   Resp 20   Wt 83 kg (182 lb 15.7 oz)   LMP 03/26/2019   SpO2 98%   BMI 26.26 kg/m²

## 2020-01-06 ENCOUNTER — HOSPITAL ENCOUNTER (EMERGENCY)
Facility: MEDICAL CENTER | Age: 20
End: 2020-01-06
Attending: EMERGENCY MEDICINE
Payer: COMMERCIAL

## 2020-01-06 ENCOUNTER — APPOINTMENT (OUTPATIENT)
Dept: RADIOLOGY | Facility: MEDICAL CENTER | Age: 20
End: 2020-01-06
Attending: EMERGENCY MEDICINE
Payer: COMMERCIAL

## 2020-01-06 VITALS
HEIGHT: 71 IN | SYSTOLIC BLOOD PRESSURE: 115 MMHG | RESPIRATION RATE: 18 BRPM | DIASTOLIC BLOOD PRESSURE: 72 MMHG | WEIGHT: 174.16 LBS | BODY MASS INDEX: 24.38 KG/M2 | OXYGEN SATURATION: 99 % | TEMPERATURE: 98.2 F | HEART RATE: 83 BPM

## 2020-01-06 DIAGNOSIS — J06.9 UPPER RESPIRATORY TRACT INFECTION, UNSPECIFIED TYPE: ICD-10-CM

## 2020-01-06 LAB
FLUAV RNA SPEC QL NAA+PROBE: NEGATIVE
FLUBV RNA SPEC QL NAA+PROBE: NEGATIVE
HCG SERPL QL: NEGATIVE
S PYO DNA SPEC NAA+PROBE: NOT DETECTED

## 2020-01-06 PROCEDURE — 87651 STREP A DNA AMP PROBE: CPT

## 2020-01-06 PROCEDURE — 84703 CHORIONIC GONADOTROPIN ASSAY: CPT

## 2020-01-06 PROCEDURE — 87502 INFLUENZA DNA AMP PROBE: CPT

## 2020-01-06 PROCEDURE — 71045 X-RAY EXAM CHEST 1 VIEW: CPT

## 2020-01-06 PROCEDURE — 700105 HCHG RX REV CODE 258: Performed by: EMERGENCY MEDICINE

## 2020-01-06 PROCEDURE — 96374 THER/PROPH/DIAG INJ IV PUSH: CPT

## 2020-01-06 PROCEDURE — 700111 HCHG RX REV CODE 636 W/ 250 OVERRIDE (IP): Performed by: EMERGENCY MEDICINE

## 2020-01-06 PROCEDURE — 99284 EMERGENCY DEPT VISIT MOD MDM: CPT

## 2020-01-06 RX ORDER — IBUPROFEN 600 MG/1
600 TABLET ORAL EVERY 6 HOURS PRN
Qty: 30 TAB | Refills: 0 | Status: SHIPPED | OUTPATIENT
Start: 2020-01-06

## 2020-01-06 RX ORDER — ONDANSETRON 4 MG/1
4 TABLET, ORALLY DISINTEGRATING ORAL EVERY 8 HOURS PRN
Qty: 10 TAB | Refills: 0 | Status: SHIPPED | OUTPATIENT
Start: 2020-01-06 | End: 2020-01-11

## 2020-01-06 RX ORDER — KETOROLAC TROMETHAMINE 30 MG/ML
15 INJECTION, SOLUTION INTRAMUSCULAR; INTRAVENOUS ONCE
Status: COMPLETED | OUTPATIENT
Start: 2020-01-06 | End: 2020-01-06

## 2020-01-06 RX ORDER — ACETAMINOPHEN 500 MG
500-1000 TABLET ORAL EVERY 6 HOURS PRN
Qty: 30 TAB | Refills: 0 | Status: SHIPPED | OUTPATIENT
Start: 2020-01-06

## 2020-01-06 RX ORDER — SODIUM CHLORIDE 9 MG/ML
1000 INJECTION, SOLUTION INTRAVENOUS ONCE
Status: COMPLETED | OUTPATIENT
Start: 2020-01-06 | End: 2020-01-06

## 2020-01-06 RX ADMIN — SODIUM CHLORIDE 1000 ML: 9 INJECTION, SOLUTION INTRAVENOUS at 22:42

## 2020-01-06 RX ADMIN — KETOROLAC TROMETHAMINE 15 MG: 30 INJECTION, SOLUTION INTRAMUSCULAR at 22:40

## 2020-01-06 SDOH — HEALTH STABILITY: MENTAL HEALTH: HOW OFTEN DO YOU HAVE 6 OR MORE DRINKS ON ONE OCCASION?: NEVER

## 2020-01-06 SDOH — HEALTH STABILITY: MENTAL HEALTH: HOW MANY STANDARD DRINKS CONTAINING ALCOHOL DO YOU HAVE ON A TYPICAL DAY?: 1 OR 2

## 2020-01-06 SDOH — HEALTH STABILITY: MENTAL HEALTH: HOW OFTEN DO YOU HAVE A DRINK CONTAINING ALCOHOL?: MONTHLY OR LESS

## 2020-01-07 NOTE — ED NOTES
Patient discharged home per ERP order. PIV removed. RX x 3 given to patient. Discharge teaching and education provided to patient. Patient verbalized understanding of discharge teaching and education. Patient to drive self home. Able to ambulate safely off unit. No other questions at this time.

## 2020-01-07 NOTE — ED PROVIDER NOTES
ED Provider Note    Scribed for Tobias Johnston M.D. by Ana Rodriguez. 1/6/2020  10:20 PM    Primary care provider: Pcp Pt States None  Means of arrival: Walk-In  History obtained from: Patient  History limited by: None    CHIEF COMPLAINT  Chief Complaint   Patient presents with   • Flu Like Symptoms     c/o flu like symptoms. cough / chill / bodyaches and sore throat x 2 days. + nasal congestion. afebrile in triage.       HPI  Jo Wild is a 19 y.o. female who presents to the Emergency Department for difficulty breathing onset 2 days ago. Patient also endorses cough, body aches, chills, sore throat, congestion, and night sweats. She notes she felt nauseous this morning. She denies any diarrhea, abdominal pain, chest pain, or dysuria. Patient denies any trauma or personal or familial history of cancer. She additionally denies any chance of pregnancy.     REVIEW OF SYSTEMS  Pertinent positives include: difficulty breathing, cough, body aches, chills, sore throat, congestion, and night sweats. Pertinent negatives include: diarrhea, abdominal pain, chest pain, or dysuria. See history of present illness. All other systems are negative.     PAST MEDICAL HISTORY   has a past medical history of Eczema.    SURGICAL HISTORY  patient denies any surgical history    SOCIAL HISTORY  Social History     Tobacco Use   • Smoking status: Current Every Day Smoker     Packs/day: 0.02     Types: Cigarettes   • Smokeless tobacco: Never Used   Substance Use Topics   • Alcohol use: Yes     Frequency: Monthly or less     Drinks per session: 1 or 2     Binge frequency: Never   • Drug use: Yes     Comment: marijuana      Social History     Substance and Sexual Activity   Drug Use Yes    Comment: marijuana       FAMILY HISTORY  History reviewed. No pertinent family history.    CURRENT MEDICATIONS  Home Medications    **Home medications have not yet been reviewed for this encounter**         ALLERGIES  No Known  "Allergies    PHYSICAL EXAM  VITAL SIGNS: /91   Pulse (!) 117   Temp 36.5 °C (97.7 °F) (Temporal)   Resp 18   Ht 1.803 m (5' 11\")   Wt 79 kg (174 lb 2.6 oz)   LMP 12/22/2018   SpO2 99%   BMI 24.29 kg/m²     Constitutional: Alert in no apparent distress.  HENT: No signs of trauma, Bilateral external ears normal, Nose normal. Uvula midline.   Eyes: Pupils are equal and reactive, Conjunctiva normal, Non-icteric.   Neck: Normal range of motion, No tenderness, Supple, No stridor.   Lymphatic: No lymphadenopathy noted.   Cardiovascular: Regular rate and rhythm, no murmurs.   Thorax & Lungs: Normal breath sounds, No respiratory distress, No wheezing, No chest tenderness.   Abdomen:  Soft, No tenderness, No peritoneal signs, No masses, No pulsatile masses.   Skin: Warm, Dry, No erythema, No rash.   Back: No bony tenderness, No CVA tenderness.   Extremities: Intact distal pulses, No edema, No tenderness, No cyanosis.  Musculoskeletal: Good range of motion in all major joints. No tenderness to palpation or major deformities noted.   Neurologic: Alert , Normal motor function, Normal sensory function, No focal deficits noted.   Psychiatric: Affect normal, Judgment normal, Mood normal.     DIAGNOSTIC STUDIES / PROCEDURES    LABS  Labs Reviewed   INFLUENZA A/B BY PCR   HCG QUAL SERUM   GROUP A STREP BY PCR   RAPID STREP,CULT IF INDICATED      All labs reviewed by me.    RADIOLOGY  DX-CHEST-PORTABLE (1 VIEW)   Final Result      Normal chest.        The radiologist's interpretation of all radiological studies have been reviewed by me.    COURSE & MEDICAL DECISION MAKING  Nursing notes, VS, PMSFHx reviewed in chart.    19 y.o. female p/w chief complaint of difficulty breathing, cough, congestion, body aches, night sweats, and sore throat.    10:20 PM Patient seen and examined at bedside.      The differential diagnoses include but are not limited to:   DX-chest ordered to rule out pneumonia, no e/o  Rapid strep and " influenza ordered, neg  Beta-hCG qual ordered to rule out pregnancy, neg  NS infusion 1,000 mL and Toradol injection 15 mg for symptom treatment.     Intravenous fluids administered for dehydration.  Patient not appropriate for oral rehydration, as surgical process needs to be ruled out before trial of oral rehydration.   On repeat evaluation, improved.  Pt w/ positive fluid response.    Patient feels a lot better after fluids and Toradol and plan for follow-up with primary care within the next week.    The patient will return for new or worsening symptoms and is stable at the time of discharge.    The patient is referred to a primary physician for blood pressure management, diabetic screening, and for all other preventative health concerns.    DISPOSITION:  Patient will be discharged home in stable condition.    FOLLOW UP:  [facility], Emergency Dept  [address] [facility] [address] [phone]    If symptoms worsen    [address] [address]  [phone]          OUTPATIENT MEDICATIONS:  Discharge Medication List as of 1/6/2020 11:48 PM      START taking these medications    Details   acetaminophen (TYLENOL) 500 MG Tab Take 1-2 Tabs by mouth every 6 hours as needed., Disp-30 Tab, R-0, Print Rx Paper      ibuprofen (MOTRIN) 600 MG Tab Take 1 Tab by mouth every 6 hours as needed., Disp-30 Tab, R-0, Print Rx Paper      ondansetron (ZOFRAN ODT) 4 MG TABLET DISPERSIBLE Take 1 Tab by mouth every 8 hours as needed for Nausea for up to 5 days., Disp-10 Tab, R-0, Print Rx Paper             FINAL IMPRESSION  1. Upper respiratory tract infection, unspecified type          I, Ana Serna), am scribing for, and in the presence of, Tobias Johnston M.D..    Electronically signed by: Ana Serna), 1/6/2020    ITobias M.D. personally performed the services described in this documentation, as scribed by Ana Rodriguez  in my presence, and it is both accurate and complete. C.    The note accurately reflects work and decisions made by me.  Tobias Johnston  1/7/2020  12:26 AM

## 2020-01-07 NOTE — ED TRIAGE NOTES
Jo Wild  19 y.o.  female  Chief Complaint   Patient presents with   • Flu Like Symptoms     c/o flu like symptoms. cough / chill / bodyaches and sore throat x 2 days. + nasal congestion. afebrile in triage.

## 2020-04-14 ENCOUNTER — HOSPITAL ENCOUNTER (EMERGENCY)
Facility: MEDICAL CENTER | Age: 20
End: 2020-04-14
Attending: EMERGENCY MEDICINE
Payer: COMMERCIAL

## 2020-04-14 VITALS
OXYGEN SATURATION: 98 % | HEIGHT: 71 IN | SYSTOLIC BLOOD PRESSURE: 100 MMHG | HEART RATE: 101 BPM | DIASTOLIC BLOOD PRESSURE: 51 MMHG | RESPIRATION RATE: 20 BRPM | TEMPERATURE: 96.5 F | BODY MASS INDEX: 25.34 KG/M2 | WEIGHT: 181 LBS

## 2020-04-14 DIAGNOSIS — T78.40XA ALLERGIC REACTION, INITIAL ENCOUNTER: ICD-10-CM

## 2020-04-14 PROCEDURE — 700111 HCHG RX REV CODE 636 W/ 250 OVERRIDE (IP)

## 2020-04-14 PROCEDURE — 99284 EMERGENCY DEPT VISIT MOD MDM: CPT

## 2020-04-14 PROCEDURE — 96374 THER/PROPH/DIAG INJ IV PUSH: CPT

## 2020-04-14 PROCEDURE — 36415 COLL VENOUS BLD VENIPUNCTURE: CPT

## 2020-04-14 PROCEDURE — 700111 HCHG RX REV CODE 636 W/ 250 OVERRIDE (IP): Performed by: EMERGENCY MEDICINE

## 2020-04-14 PROCEDURE — 96372 THER/PROPH/DIAG INJ SC/IM: CPT

## 2020-04-14 RX ORDER — EPINEPHRINE 1 MG/ML(1)
0.3 AMPUL (ML) INJECTION ONCE
Status: COMPLETED | OUTPATIENT
Start: 2020-04-14 | End: 2020-04-14

## 2020-04-14 RX ORDER — PREDNISONE 20 MG/1
60 TABLET ORAL DAILY
Status: DISCONTINUED | OUTPATIENT
Start: 2020-04-14 | End: 2020-04-14 | Stop reason: HOSPADM

## 2020-04-14 RX ORDER — PREDNISONE 20 MG/1
60 TABLET ORAL DAILY
Status: DISCONTINUED | OUTPATIENT
Start: 2020-04-15 | End: 2020-04-14

## 2020-04-14 RX ORDER — EPINEPHRINE 1 MG/ML(1)
AMPUL (ML) INJECTION
Status: COMPLETED
Start: 2020-04-14 | End: 2020-04-14

## 2020-04-14 RX ORDER — METHYLPREDNISOLONE SODIUM SUCCINATE 125 MG/2ML
INJECTION, POWDER, LYOPHILIZED, FOR SOLUTION INTRAMUSCULAR; INTRAVENOUS
Status: DISCONTINUED
Start: 2020-04-14 | End: 2020-04-14 | Stop reason: HOSPADM

## 2020-04-14 RX ORDER — DIPHENHYDRAMINE HYDROCHLORIDE 50 MG/ML
INJECTION INTRAMUSCULAR; INTRAVENOUS
Status: COMPLETED
Start: 2020-04-14 | End: 2020-04-14

## 2020-04-14 RX ORDER — DIPHENHYDRAMINE HYDROCHLORIDE 50 MG/ML
50 INJECTION INTRAMUSCULAR; INTRAVENOUS ONCE
Status: COMPLETED | OUTPATIENT
Start: 2020-04-14 | End: 2020-04-14

## 2020-04-14 RX ADMIN — EPINEPHRINE 0.3 MG: 1 INJECTION INTRAMUSCULAR; INTRAVENOUS; SUBCUTANEOUS at 17:05

## 2020-04-14 RX ADMIN — DIPHENHYDRAMINE HYDROCHLORIDE 50 MG: 50 INJECTION INTRAMUSCULAR; INTRAVENOUS at 17:11

## 2020-04-14 RX ADMIN — Medication 0.3 MG: at 17:05

## 2020-04-14 RX ADMIN — PREDNISONE 60 MG: 20 TABLET ORAL at 18:08

## 2020-04-14 ASSESSMENT — FIBROSIS 4 INDEX: FIB4 SCORE: 0.24

## 2020-04-14 NOTE — ED TRIAGE NOTES
Patient had some tacos today and started to feel itchy and a little short of breath about 15 minutes after eating. Denies throat swelling, difficulty swallowing, speaking clear full sentences

## 2020-04-15 NOTE — ED PROVIDER NOTES
ED Provider Note    Scribed for Song Owesn M.D. by Sandie Villalpando. 4/14/2020  5:34 PM    Primary care provider: None Noted  Means of arrival: Walk-In  History obtained from: Patient  History limited by: None    CHIEF COMPLAINT  Chief Complaint   Patient presents with   • Allergic Reaction     Patient had some tacos today and started to feel itchy and a little short of breath about 15 minutes after eating. Denies throat swelling, difficulty swallowing, speaking clear full sentences       HPI  Jo Wild is a 19 y.o. female who presents to the Emergency Department for complaints of allergic reaction onset 15 minutes ago after eating a meal. She reports associated symptoms of itchiness and rash to entire body. She feels slightly short of breath, however denies trouble breathing or difficulty swallowing. She states that she has not had similar allergic reactions prior to this one. Denies any exacerbating or alleviating factors at this time. Negative for fever, chills, or chest pain.    REVIEW OF SYSTEMS  Pertinent negatives include no difficulty breathing, difficulty swallowing, fever, chills, chest pain. As above, all other systems reviewed and are negative.   See HPI for further details.     PAST MEDICAL HISTORY   has a past medical history of Eczema.    SURGICAL HISTORY  patient denies any surgical history    SOCIAL HISTORY  Social History     Tobacco Use   • Smoking status: Current Every Day Smoker     Packs/day: 0.02     Types: Cigarettes   • Smokeless tobacco: Never Used   Substance Use Topics   • Alcohol use: Yes     Frequency: Monthly or less     Drinks per session: 1 or 2     Binge frequency: Never   • Drug use: Yes     Comment: marijuana      Social History     Substance and Sexual Activity   Drug Use Yes    Comment: marijuana       FAMILY HISTORY  History reviewed. No pertinent family history.    CURRENT MEDICATIONS  Current Outpatient Medications:   •  acetaminophen (TYLENOL) 500 MG Tab, Take  No "1-2 Tabs by mouth every 6 hours as needed., Disp: 30 Tab, Rfl: 0  •  ibuprofen (MOTRIN) 600 MG Tab, Take 1 Tab by mouth every 6 hours as needed., Disp: 30 Tab, Rfl: 0  •  hydrOXYzine HCl (ATARAX) 25 MG Tab, Take 1 Tab by mouth 3 times a day as needed for Itching., Disp: 30 Tab, Rfl: 0  •  clobetasol (TEMOVATE) 0.05 % Ointment, Apply 1 Application to affected area(s) 2 times a day., Disp: 60 g, Rfl: 0  •  pimecrolimus (ELIDEL) 1 % cream, Apply to affected areas twice daily as needed., Disp: 1 Tube, Rfl: 0  •  triamcinolone acetonide (KENALOG) 0.1 % Ointment, Apply 1 Application to affected area(s) 2 times a day., Disp: 1 Tube, Rfl: 0  •  triamcinolone acetonide (KENALOG) 0.025 % Cream, Apply 1 Application to affected area(s) 2 times a day., Disp: 1 Tube, Rfl: 1  •  hydrOXYzine HCl (ATARAX) 25 MG Tab, Take 1 Tab by mouth 3 times a day as needed for Itching., Disp: 30 Tab, Rfl: 0     ALLERGIES  Allergies   Allergen Reactions   • Kenalog Anaphylaxis       PHYSICAL EXAM  VITAL SIGNS: /59   Pulse (!) 115   Temp 35.8 °C (96.5 °F) (Oral)   Resp 13   Ht 1.803 m (5' 11\")   Wt 82.1 kg (181 lb)   SpO2 100%   BMI 25.24 kg/m²   Constitutional: Well developed, Well nourished, Non-toxic appearance. Appearing anxious, saying taht he is having a hard time speaking and that she is itchy.   HENT: No uvular edema, no tongue swelling, no drooling. Normocephalic, Atraumatic, Bilateral external ears normal, Oropharynx is clear mucous membranes are moist. No oral exudates or nasal discharge.   Eyes: Pupils are equal round and reactive, EOMI, Conjunctiva normal, No discharge.   Neck: Normal range of motion, No tenderness, Supple, No stridor. No meningismus.  Lymphatic: No lymphadenopathy noted.   Cardiovascular: Regular rate and rhythm without murmur rub or gallop.  Thorax & Lungs: Clear breath sounds bilaterally without wheezes, rhonchi or rales. There is no chest wall tenderness.   Abdomen: Soft non-tender non-distended. There " is no rebound or guarding. No organomegaly is appreciated. Bowel sounds are normal.  Skin: Slight erythema, difficult based on skin color.   Back: No CVA or spinal tenderness.   Extremities: Intact distal pulses, No edema, No tenderness, No cyanosis, No clubbing. Capillary refill is less than 2 seconds.  Musculoskeletal: Good range of motion in all major joints. No tenderness to palpation or major deformities noted.   Neurologic: Alert & oriented x 3, Normal motor function, Normal sensory function, No focal deficits noted. Reflexes are normal.  Psychiatric: Affect normal, Judgment normal, Mood normal. There is no suicidal ideation or patient reported hallucinations.     COURSE & MEDICAL DECISION MAKING  Nursing notes, VS, PMSFHx reviewed in chart.    5:00 PM Patient seen and examined at bedside. She presents with allergic reaction, with symptoms of shortness of breath and rash. Patient was treated with methyl prednisone sodium 125 mg, Benadryl 50 mg, epinephrine injection 0.3 mg, and prednisone 60 mg for her symptoms.     There is no evident evidence that she required lab work or imaging.  She seemed to start improving about a half an hour after medications.  Did not require any additional dosing of epinephrine    6:46 PM - Patient was reevaluated at bedside. Patient is feeling much improved after treatment. Discussed plan for discharge with epinephrine in case of emergencies. Provided return precaution and follow up instructions. She understands and is comfortable with plan for discharge.     Patient has had high blood pressure while in the emergency department, felt likely secondary to medical condition. Counseled patient to monitor blood pressure at home and follow up with primary care physician.      The patient will return for new or worsening symptoms and is stable at the time of discharge.    Please note a critical care time of 30 minutes is spent the care of this patient outside of procedure  time    DISPOSITION:  Patient will be discharged home in stable condition.    FINAL IMPRESSION  1. Allergic reaction, initial encounter    Critical care time, 30 minutes     ISandie (Scribe), am scribing for, and in the presence of, Song Owens M.D..    Electronically signed by: Sandie Villalpando (Scribe), 4/14/2020    Song REYES M.D. personally performed the services described in this documentation, as scribed by Sandie Villalpando in my presence, and it is both accurate and complete. C    The note accurately reflects work and decisions made by me.  Song Owens M.D.  4/14/2020  7:01 PM

## 2022-07-19 NOTE — ED TRIAGE NOTES
"Jo Farfan Wild  Chief Complaint   Patient presents with   • Rash     Pt reports worsening of her eczema x 2 months. Pt reports that it has gotten so she is in pain while sleeping. Pt previously prescribed triamcinolone, but has no had a prescription for appx 2 months.    Pt states that the worst part of the rash is on her butt.     Pt ambulatory to triage with above complaint.     /78   Pulse (!) 104   Temp 36.6 °C (97.8 °F)   Resp 16   Ht 1.778 m (5' 10\")   Wt 82.6 kg (182 lb 1.6 oz)   LMP 03/26/2019   SpO2 100%   Breastfeeding? No   BMI 26.13 kg/m²     Pt informed of triage process and encouraged to notify staff of any changes or concerns. Pt verbalized understanding of instructions.  Pt placed back in lobby.     " Graft Donor Site Bandage (Optional-Leave Blank If You Don't Want In Note): Vaseline, a nonstick bandage, brown tape and a pressure bandage were applied to the donor site.